# Patient Record
Sex: FEMALE | Race: WHITE | HISPANIC OR LATINO | Employment: FULL TIME | URBAN - METROPOLITAN AREA
[De-identification: names, ages, dates, MRNs, and addresses within clinical notes are randomized per-mention and may not be internally consistent; named-entity substitution may affect disease eponyms.]

---

## 2018-07-18 ENCOUNTER — APPOINTMENT (OUTPATIENT)
Dept: RADIOLOGY | Facility: MEDICAL CENTER | Age: 28
End: 2018-07-18
Attending: NURSE PRACTITIONER
Payer: COMMERCIAL

## 2018-07-26 ENCOUNTER — HOSPITAL ENCOUNTER (OUTPATIENT)
Dept: RADIOLOGY | Facility: MEDICAL CENTER | Age: 28
End: 2018-07-26
Attending: NURSE PRACTITIONER
Payer: COMMERCIAL

## 2018-07-26 DIAGNOSIS — R10.2 ADNEXAL TENDERNESS, RIGHT: ICD-10-CM

## 2018-07-26 DIAGNOSIS — R10.12 ABDOMINAL PAIN, LEFT UPPER QUADRANT: ICD-10-CM

## 2018-07-26 PROCEDURE — 76830 TRANSVAGINAL US NON-OB: CPT

## 2018-07-26 PROCEDURE — 76700 US EXAM ABDOM COMPLETE: CPT

## 2019-10-05 ENCOUNTER — HOSPITAL ENCOUNTER (OUTPATIENT)
Facility: MEDICAL CENTER | Age: 29
End: 2019-10-05
Attending: OBSTETRICS & GYNECOLOGY | Admitting: OBSTETRICS & GYNECOLOGY
Payer: COMMERCIAL

## 2019-12-13 ENCOUNTER — HOSPITAL ENCOUNTER (INPATIENT)
Facility: MEDICAL CENTER | Age: 29
LOS: 3 days | End: 2019-12-16
Attending: OBSTETRICS & GYNECOLOGY | Admitting: OBSTETRICS & GYNECOLOGY
Payer: COMMERCIAL

## 2019-12-13 ENCOUNTER — APPOINTMENT (OUTPATIENT)
Dept: RADIOLOGY | Facility: REHABILITATION | Age: 29
End: 2019-12-13
Attending: OBSTETRICS & GYNECOLOGY
Payer: COMMERCIAL

## 2019-12-13 ENCOUNTER — APPOINTMENT (OUTPATIENT)
Dept: RADIOLOGY | Facility: MEDICAL CENTER | Age: 29
End: 2019-12-13
Attending: OBSTETRICS & GYNECOLOGY
Payer: COMMERCIAL

## 2019-12-13 LAB
BASOPHILS # BLD AUTO: 0.6 % (ref 0–1.8)
BASOPHILS # BLD: 0.07 K/UL (ref 0–0.12)
EOSINOPHIL # BLD AUTO: 0.07 K/UL (ref 0–0.51)
EOSINOPHIL NFR BLD: 0.6 % (ref 0–6.9)
ERYTHROCYTE [DISTWIDTH] IN BLOOD BY AUTOMATED COUNT: 45.2 FL (ref 35.9–50)
HCT VFR BLD AUTO: 39.1 % (ref 37–47)
HGB BLD-MCNC: 13.2 G/DL (ref 12–16)
HOLDING TUBE BB 8507: NORMAL
IMM GRANULOCYTES # BLD AUTO: 0.48 K/UL (ref 0–0.11)
IMM GRANULOCYTES NFR BLD AUTO: 3.9 % (ref 0–0.9)
LYMPHOCYTES # BLD AUTO: 2.18 K/UL (ref 1–4.8)
LYMPHOCYTES NFR BLD: 17.5 % (ref 22–41)
MCH RBC QN AUTO: 29.1 PG (ref 27–33)
MCHC RBC AUTO-ENTMCNC: 33.8 G/DL (ref 33.6–35)
MCV RBC AUTO: 86.1 FL (ref 81.4–97.8)
MONOCYTES # BLD AUTO: 1.12 K/UL (ref 0–0.85)
MONOCYTES NFR BLD AUTO: 9 % (ref 0–13.4)
NEUTROPHILS # BLD AUTO: 8.51 K/UL (ref 2–7.15)
NEUTROPHILS NFR BLD: 68.4 % (ref 44–72)
NRBC # BLD AUTO: 0 K/UL
NRBC BLD-RTO: 0 /100 WBC
PLATELET # BLD AUTO: 205 K/UL (ref 164–446)
PMV BLD AUTO: 11 FL (ref 9–12.9)
RBC # BLD AUTO: 4.54 M/UL (ref 4.2–5.4)
WBC # BLD AUTO: 12.4 K/UL (ref 4.8–10.8)

## 2019-12-13 PROCEDURE — 3E0P7VZ INTRODUCTION OF HORMONE INTO FEMALE REPRODUCTIVE, VIA NATURAL OR ARTIFICIAL OPENING: ICD-10-PCS | Performed by: OBSTETRICS & GYNECOLOGY

## 2019-12-13 PROCEDURE — 770002 HCHG ROOM/CARE - OB PRIVATE (112)

## 2019-12-13 PROCEDURE — A9270 NON-COVERED ITEM OR SERVICE: HCPCS

## 2019-12-13 PROCEDURE — 36415 COLL VENOUS BLD VENIPUNCTURE: CPT

## 2019-12-13 PROCEDURE — 59200 INSERT CERVICAL DILATOR: CPT

## 2019-12-13 PROCEDURE — 700102 HCHG RX REV CODE 250 W/ 637 OVERRIDE(OP)

## 2019-12-13 PROCEDURE — 76819 FETAL BIOPHYS PROFIL W/O NST: CPT

## 2019-12-13 PROCEDURE — 85025 COMPLETE CBC W/AUTO DIFF WBC: CPT

## 2019-12-13 PROCEDURE — C1726 CATH, BAL DIL, NON-VASCULAR: HCPCS

## 2019-12-13 PROCEDURE — 700111 HCHG RX REV CODE 636 W/ 250 OVERRIDE (IP)

## 2019-12-13 PROCEDURE — 700105 HCHG RX REV CODE 258

## 2019-12-13 RX ORDER — ONDANSETRON 4 MG/1
4 TABLET, ORALLY DISINTEGRATING ORAL EVERY 6 HOURS PRN
Status: DISCONTINUED | OUTPATIENT
Start: 2019-12-13 | End: 2019-12-16 | Stop reason: HOSPADM

## 2019-12-13 RX ORDER — ALUMINA, MAGNESIA, AND SIMETHICONE 2400; 2400; 240 MG/30ML; MG/30ML; MG/30ML
30 SUSPENSION ORAL EVERY 6 HOURS PRN
Status: DISCONTINUED | OUTPATIENT
Start: 2019-12-13 | End: 2019-12-14 | Stop reason: HOSPADM

## 2019-12-13 RX ORDER — ONDANSETRON 2 MG/ML
4 INJECTION INTRAMUSCULAR; INTRAVENOUS EVERY 6 HOURS PRN
Status: DISCONTINUED | OUTPATIENT
Start: 2019-12-13 | End: 2019-12-16 | Stop reason: HOSPADM

## 2019-12-13 RX ORDER — SODIUM CHLORIDE, SODIUM LACTATE, POTASSIUM CHLORIDE, CALCIUM CHLORIDE 600; 310; 30; 20 MG/100ML; MG/100ML; MG/100ML; MG/100ML
INJECTION, SOLUTION INTRAVENOUS CONTINUOUS
Status: DISCONTINUED | OUTPATIENT
Start: 2019-12-13 | End: 2019-12-16 | Stop reason: HOSPADM

## 2019-12-13 RX ORDER — MISOPROSTOL 200 UG/1
800 TABLET ORAL
Status: DISCONTINUED | OUTPATIENT
Start: 2019-12-13 | End: 2019-12-14 | Stop reason: HOSPADM

## 2019-12-13 RX ORDER — METHYLERGONOVINE MALEATE 0.2 MG/ML
0.2 INJECTION INTRAVENOUS
Status: DISCONTINUED | OUTPATIENT
Start: 2019-12-13 | End: 2019-12-14 | Stop reason: HOSPADM

## 2019-12-13 RX ORDER — SODIUM CHLORIDE, SODIUM LACTATE, POTASSIUM CHLORIDE, CALCIUM CHLORIDE 600; 310; 30; 20 MG/100ML; MG/100ML; MG/100ML; MG/100ML
INJECTION, SOLUTION INTRAVENOUS
Status: COMPLETED
Start: 2019-12-13 | End: 2019-12-13

## 2019-12-13 RX ADMIN — OXYTOCIN 1 MILLI-UNITS/MIN: 10 INJECTION, SOLUTION INTRAMUSCULAR; INTRAVENOUS at 21:30

## 2019-12-13 RX ADMIN — SODIUM CHLORIDE, POTASSIUM CHLORIDE, SODIUM LACTATE AND CALCIUM CHLORIDE 1000 ML: 600; 310; 30; 20 INJECTION, SOLUTION INTRAVENOUS at 21:30

## 2019-12-13 RX ADMIN — MISOPROSTOL 25 MCG: 100 TABLET ORAL at 16:06

## 2019-12-13 SDOH — ECONOMIC STABILITY: TRANSPORTATION INSECURITY
IN THE PAST 12 MONTHS, HAS LACK OF TRANSPORTATION KEPT YOU FROM MEETINGS, WORK, OR FROM GETTING THINGS NEEDED FOR DAILY LIVING?: NO

## 2019-12-13 SDOH — ECONOMIC STABILITY: FOOD INSECURITY: WITHIN THE PAST 12 MONTHS, YOU WORRIED THAT YOUR FOOD WOULD RUN OUT BEFORE YOU GOT MONEY TO BUY MORE.: NEVER TRUE

## 2019-12-13 SDOH — ECONOMIC STABILITY: FOOD INSECURITY: WITHIN THE PAST 12 MONTHS, THE FOOD YOU BOUGHT JUST DIDN'T LAST AND YOU DIDN'T HAVE MONEY TO GET MORE.: NEVER TRUE

## 2019-12-13 SDOH — ECONOMIC STABILITY: TRANSPORTATION INSECURITY
IN THE PAST 12 MONTHS, HAS THE LACK OF TRANSPORTATION KEPT YOU FROM MEDICAL APPOINTMENTS OR FROM GETTING MEDICATIONS?: NO

## 2019-12-13 ASSESSMENT — PATIENT HEALTH QUESTIONNAIRE - PHQ9
1. LITTLE INTEREST OR PLEASURE IN DOING THINGS: NOT AT ALL
2. FEELING DOWN, DEPRESSED, IRRITABLE, OR HOPELESS: NOT AT ALL
SUM OF ALL RESPONSES TO PHQ9 QUESTIONS 1 AND 2: 0
1. LITTLE INTEREST OR PLEASURE IN DOING THINGS: NOT AT ALL
SUM OF ALL RESPONSES TO PHQ9 QUESTIONS 1 AND 2: 0
2. FEELING DOWN, DEPRESSED, IRRITABLE, OR HOPELESS: NOT AT ALL

## 2019-12-13 ASSESSMENT — LIFESTYLE VARIABLES
ALCOHOL_USE: NO
EVER_SMOKED: NEVER

## 2019-12-14 ENCOUNTER — ANESTHESIA EVENT (OUTPATIENT)
Dept: ANESTHESIOLOGY | Facility: MEDICAL CENTER | Age: 29
End: 2019-12-14
Payer: COMMERCIAL

## 2019-12-14 ENCOUNTER — ANESTHESIA (OUTPATIENT)
Dept: ANESTHESIOLOGY | Facility: MEDICAL CENTER | Age: 29
End: 2019-12-14
Payer: COMMERCIAL

## 2019-12-14 LAB
BASOPHILS # BLD AUTO: 0.1 % (ref 0–1.8)
BASOPHILS # BLD: 0.03 K/UL (ref 0–0.12)
EOSINOPHIL # BLD AUTO: 0 K/UL (ref 0–0.51)
EOSINOPHIL NFR BLD: 0 % (ref 0–6.9)
ERYTHROCYTE [DISTWIDTH] IN BLOOD BY AUTOMATED COUNT: 44.3 FL (ref 35.9–50)
HCT VFR BLD AUTO: 37.9 % (ref 37–47)
HGB BLD-MCNC: 12.9 G/DL (ref 12–16)
IMM GRANULOCYTES # BLD AUTO: 0.25 K/UL (ref 0–0.11)
IMM GRANULOCYTES NFR BLD AUTO: 1.2 % (ref 0–0.9)
LYMPHOCYTES # BLD AUTO: 1.24 K/UL (ref 1–4.8)
LYMPHOCYTES NFR BLD: 6.1 % (ref 22–41)
MCH RBC QN AUTO: 29.1 PG (ref 27–33)
MCHC RBC AUTO-ENTMCNC: 34 G/DL (ref 33.6–35)
MCV RBC AUTO: 85.4 FL (ref 81.4–97.8)
MONOCYTES # BLD AUTO: 1.47 K/UL (ref 0–0.85)
MONOCYTES NFR BLD AUTO: 7.3 % (ref 0–13.4)
NEUTROPHILS # BLD AUTO: 17.21 K/UL (ref 2–7.15)
NEUTROPHILS NFR BLD: 85.3 % (ref 44–72)
NRBC # BLD AUTO: 0 K/UL
NRBC BLD-RTO: 0 /100 WBC
PLATELET # BLD AUTO: 179 K/UL (ref 164–446)
PMV BLD AUTO: 10.8 FL (ref 9–12.9)
RBC # BLD AUTO: 4.44 M/UL (ref 4.2–5.4)
WBC # BLD AUTO: 20.2 K/UL (ref 4.8–10.8)

## 2019-12-14 PROCEDURE — 87086 URINE CULTURE/COLONY COUNT: CPT

## 2019-12-14 PROCEDURE — 303615 HCHG EPIDURAL/SPINAL ANESTHESIA FOR LABOR

## 2019-12-14 PROCEDURE — 304965 HCHG RECOVERY SERVICES

## 2019-12-14 PROCEDURE — A9270 NON-COVERED ITEM OR SERVICE: HCPCS | Performed by: OBSTETRICS & GYNECOLOGY

## 2019-12-14 PROCEDURE — 10907ZC DRAINAGE OF AMNIOTIC FLUID, THERAPEUTIC FROM PRODUCTS OF CONCEPTION, VIA NATURAL OR ARTIFICIAL OPENING: ICD-10-PCS | Performed by: OBSTETRICS & GYNECOLOGY

## 2019-12-14 PROCEDURE — 700102 HCHG RX REV CODE 250 W/ 637 OVERRIDE(OP): Performed by: OBSTETRICS & GYNECOLOGY

## 2019-12-14 PROCEDURE — 700101 HCHG RX REV CODE 250

## 2019-12-14 PROCEDURE — 700111 HCHG RX REV CODE 636 W/ 250 OVERRIDE (IP)

## 2019-12-14 PROCEDURE — 59409 OBSTETRICAL CARE: CPT

## 2019-12-14 PROCEDURE — 770002 HCHG ROOM/CARE - OB PRIVATE (112)

## 2019-12-14 PROCEDURE — 36415 COLL VENOUS BLD VENIPUNCTURE: CPT

## 2019-12-14 PROCEDURE — 700105 HCHG RX REV CODE 258: Performed by: OBSTETRICS & GYNECOLOGY

## 2019-12-14 PROCEDURE — 85025 COMPLETE CBC W/AUTO DIFF WBC: CPT

## 2019-12-14 PROCEDURE — 700111 HCHG RX REV CODE 636 W/ 250 OVERRIDE (IP): Performed by: OBSTETRICS & GYNECOLOGY

## 2019-12-14 PROCEDURE — 87040 BLOOD CULTURE FOR BACTERIA: CPT

## 2019-12-14 RX ORDER — ROPIVACAINE HYDROCHLORIDE 2 MG/ML
INJECTION, SOLUTION EPIDURAL; INFILTRATION; PERINEURAL CONTINUOUS
Status: DISCONTINUED | OUTPATIENT
Start: 2019-12-14 | End: 2019-12-16 | Stop reason: HOSPADM

## 2019-12-14 RX ORDER — CEFAZOLIN SODIUM 2 G/100ML
2 INJECTION, SOLUTION INTRAVENOUS EVERY 8 HOURS
Status: DISCONTINUED | OUTPATIENT
Start: 2019-12-14 | End: 2019-12-16

## 2019-12-14 RX ORDER — DOCUSATE SODIUM 100 MG/1
100 CAPSULE, LIQUID FILLED ORAL 2 TIMES DAILY PRN
Status: DISCONTINUED | OUTPATIENT
Start: 2019-12-14 | End: 2019-12-16 | Stop reason: HOSPADM

## 2019-12-14 RX ORDER — SODIUM CHLORIDE, SODIUM LACTATE, POTASSIUM CHLORIDE, AND CALCIUM CHLORIDE .6; .31; .03; .02 G/100ML; G/100ML; G/100ML; G/100ML
1000 INJECTION, SOLUTION INTRAVENOUS
Status: DISCONTINUED | OUTPATIENT
Start: 2019-12-14 | End: 2019-12-14 | Stop reason: HOSPADM

## 2019-12-14 RX ORDER — VITAMIN A ACETATE, BETA CAROTENE, ASCORBIC ACID, CHOLECALCIFEROL, .ALPHA.-TOCOPHEROL ACETATE, DL-, THIAMINE MONONITRATE, RIBOFLAVIN, NIACINAMIDE, PYRIDOXINE HYDROCHLORIDE, FOLIC ACID, CYANOCOBALAMIN, CALCIUM CARBONATE, FERROUS FUMARATE, ZINC OXIDE, CUPRIC OXIDE 3080; 12; 120; 400; 1; 1.84; 3; 20; 22; 920; 25; 200; 27; 10; 2 [IU]/1; UG/1; MG/1; [IU]/1; MG/1; MG/1; MG/1; MG/1; MG/1; [IU]/1; MG/1; MG/1; MG/1; MG/1; MG/1
1 TABLET, FILM COATED ORAL EVERY MORNING
Status: DISCONTINUED | OUTPATIENT
Start: 2019-12-15 | End: 2019-12-16 | Stop reason: HOSPADM

## 2019-12-14 RX ORDER — BISACODYL 10 MG
10 SUPPOSITORY, RECTAL RECTAL PRN
Status: DISCONTINUED | OUTPATIENT
Start: 2019-12-14 | End: 2019-12-16 | Stop reason: HOSPADM

## 2019-12-14 RX ORDER — OXYCODONE HYDROCHLORIDE AND ACETAMINOPHEN 5; 325 MG/1; MG/1
2 TABLET ORAL EVERY 4 HOURS PRN
Status: DISCONTINUED | OUTPATIENT
Start: 2019-12-14 | End: 2019-12-16 | Stop reason: HOSPADM

## 2019-12-14 RX ORDER — DEXTROSE, SODIUM CHLORIDE, SODIUM LACTATE, POTASSIUM CHLORIDE, AND CALCIUM CHLORIDE 5; .6; .31; .03; .02 G/100ML; G/100ML; G/100ML; G/100ML; G/100ML
INJECTION, SOLUTION INTRAVENOUS CONTINUOUS
Status: DISCONTINUED | OUTPATIENT
Start: 2019-12-14 | End: 2019-12-16 | Stop reason: HOSPADM

## 2019-12-14 RX ORDER — MISOPROSTOL 200 UG/1
600 TABLET ORAL
Status: DISCONTINUED | OUTPATIENT
Start: 2019-12-14 | End: 2019-12-16 | Stop reason: HOSPADM

## 2019-12-14 RX ORDER — ROPIVACAINE HYDROCHLORIDE 2 MG/ML
INJECTION, SOLUTION EPIDURAL; INFILTRATION; PERINEURAL
Status: COMPLETED
Start: 2019-12-14 | End: 2019-12-14

## 2019-12-14 RX ORDER — ACETAMINOPHEN 325 MG/1
650 TABLET ORAL ONCE
Status: COMPLETED | OUTPATIENT
Start: 2019-12-14 | End: 2019-12-14

## 2019-12-14 RX ORDER — SODIUM CHLORIDE, SODIUM LACTATE, POTASSIUM CHLORIDE, AND CALCIUM CHLORIDE .6; .31; .03; .02 G/100ML; G/100ML; G/100ML; G/100ML
250 INJECTION, SOLUTION INTRAVENOUS PRN
Status: DISCONTINUED | OUTPATIENT
Start: 2019-12-14 | End: 2019-12-14 | Stop reason: HOSPADM

## 2019-12-14 RX ORDER — METHYLERGONOVINE MALEATE 0.2 MG/ML
0.2 INJECTION INTRAVENOUS
Status: DISCONTINUED | OUTPATIENT
Start: 2019-12-14 | End: 2019-12-16 | Stop reason: HOSPADM

## 2019-12-14 RX ORDER — OXYCODONE HYDROCHLORIDE AND ACETAMINOPHEN 5; 325 MG/1; MG/1
1 TABLET ORAL EVERY 4 HOURS PRN
Status: DISCONTINUED | OUTPATIENT
Start: 2019-12-14 | End: 2019-12-16 | Stop reason: HOSPADM

## 2019-12-14 RX ORDER — CARBOPROST TROMETHAMINE 250 UG/ML
250 INJECTION, SOLUTION INTRAMUSCULAR
Status: DISCONTINUED | OUTPATIENT
Start: 2019-12-14 | End: 2019-12-16 | Stop reason: HOSPADM

## 2019-12-14 RX ORDER — IBUPROFEN 600 MG/1
600 TABLET ORAL EVERY 6 HOURS PRN
Status: DISCONTINUED | OUTPATIENT
Start: 2019-12-14 | End: 2019-12-16 | Stop reason: HOSPADM

## 2019-12-14 RX ORDER — SODIUM CHLORIDE, SODIUM LACTATE, POTASSIUM CHLORIDE, CALCIUM CHLORIDE 600; 310; 30; 20 MG/100ML; MG/100ML; MG/100ML; MG/100ML
INJECTION, SOLUTION INTRAVENOUS PRN
Status: DISCONTINUED | OUTPATIENT
Start: 2019-12-14 | End: 2019-12-16 | Stop reason: HOSPADM

## 2019-12-14 RX ADMIN — ROPIVACAINE HYDROCHLORIDE 200 MG: 2 INJECTION, SOLUTION EPIDURAL; INFILTRATION; PERINEURAL at 11:35

## 2019-12-14 RX ADMIN — SODIUM CHLORIDE, POTASSIUM CHLORIDE, SODIUM LACTATE AND CALCIUM CHLORIDE: 600; 310; 30; 20 INJECTION, SOLUTION INTRAVENOUS at 11:18

## 2019-12-14 RX ADMIN — SODIUM CHLORIDE, SODIUM LACTATE, POTASSIUM CHLORIDE, CALCIUM CHLORIDE AND DEXTROSE MONOHYDRATE: 5; 600; 310; 30; 20 INJECTION, SOLUTION INTRAVENOUS at 12:27

## 2019-12-14 RX ADMIN — FENTANYL CITRATE 100 MCG: 0.05 INJECTION, SOLUTION INTRAMUSCULAR; INTRAVENOUS at 09:49

## 2019-12-14 RX ADMIN — ROPIVACAINE HYDROCHLORIDE 200 MG: 2 INJECTION, SOLUTION EPIDURAL; INFILTRATION at 11:35

## 2019-12-14 RX ADMIN — SODIUM CHLORIDE, POTASSIUM CHLORIDE, SODIUM LACTATE AND CALCIUM CHLORIDE: 600; 310; 30; 20 INJECTION, SOLUTION INTRAVENOUS at 05:39

## 2019-12-14 RX ADMIN — OXYTOCIN 20 UNITS: 10 INJECTION, SOLUTION INTRAMUSCULAR; INTRAVENOUS at 21:29

## 2019-12-14 RX ADMIN — SODIUM CHLORIDE, POTASSIUM CHLORIDE, SODIUM LACTATE AND CALCIUM CHLORIDE: 600; 310; 30; 20 INJECTION, SOLUTION INTRAVENOUS at 10:33

## 2019-12-14 RX ADMIN — EPHEDRINE SULFATE 5 MG: 50 INJECTION INTRAVENOUS at 12:25

## 2019-12-14 RX ADMIN — FENTANYL CITRATE 100 MCG: 0.05 INJECTION, SOLUTION INTRAMUSCULAR; INTRAVENOUS at 04:29

## 2019-12-14 RX ADMIN — ACETAMINOPHEN 650 MG: 325 TABLET, FILM COATED ORAL at 21:27

## 2019-12-14 ASSESSMENT — PATIENT HEALTH QUESTIONNAIRE - PHQ9
2. FEELING DOWN, DEPRESSED, IRRITABLE, OR HOPELESS: NOT AT ALL
1. LITTLE INTEREST OR PLEASURE IN DOING THINGS: NOT AT ALL
SUM OF ALL RESPONSES TO PHQ9 QUESTIONS 1 AND 2: 0

## 2019-12-14 NOTE — PROGRESS NOTES
Labor Progress Note    Pricila Velasquez   38w4d      Subjective:  Pt now comfortable with epidural.  Uterine contractions:yes  Pain: No    Objective:   Vitals:    12/14/19 0540 12/14/19 0658 12/14/19 0749 12/14/19 0954   BP: 123/65 132/68 132/75 145/66   Pulse: 78 78 75 75   Temp:  36.7 °C (98 °F) 36.7 °C (98 °F) 37 °C (98.6 °F)   TempSrc:  Temporal Temporal Temporal   Weight:       Height:         Fetal heart variability:130's decreased variability, positive scalp stim  [place]: q 1-2  SVE: 2/90/0        Membranes ruptured: .yes    Meds:   Epidural : .yes  Pitocin: .yes, 5 mu    Labs:  Recent Results (from the past 24 hour(s))   CBC WITH DIFFERENTIAL    Collection Time: 12/13/19  2:50 PM   Result Value Ref Range    WBC 12.4 (H) 4.8 - 10.8 K/uL    RBC 4.54 4.20 - 5.40 M/uL    Hemoglobin 13.2 12.0 - 16.0 g/dL    Hematocrit 39.1 37.0 - 47.0 %    MCV 86.1 81.4 - 97.8 fL    MCH 29.1 27.0 - 33.0 pg    MCHC 33.8 33.6 - 35.0 g/dL    RDW 45.2 35.9 - 50.0 fL    Platelet Count 205 164 - 446 K/uL    MPV 11.0 9.0 - 12.9 fL    Neutrophils-Polys 68.40 44.00 - 72.00 %    Lymphocytes 17.50 (L) 22.00 - 41.00 %    Monocytes 9.00 0.00 - 13.40 %    Eosinophils 0.60 0.00 - 6.90 %    Basophils 0.60 0.00 - 1.80 %    Immature Granulocytes 3.90 (H) 0.00 - 0.90 %    Nucleated RBC 0.00 /100 WBC    Neutrophils (Absolute) 8.51 (H) 2.00 - 7.15 K/uL    Lymphs (Absolute) 2.18 1.00 - 4.80 K/uL    Monos (Absolute) 1.12 (H) 0.00 - 0.85 K/uL    Eos (Absolute) 0.07 0.00 - 0.51 K/uL    Baso (Absolute) 0.07 0.00 - 0.12 K/uL    Immature Granulocytes (abs) 0.48 (H) 0.00 - 0.11 K/uL    NRBC (Absolute) 0.00 K/uL   Hold Blood Bank Specimen (Not Tested)    Collection Time: 12/13/19  2:50 PM   Result Value Ref Range    Holding Tube - Bb DONE        Assessment:   38w4d/oligohydramnios/IOL- pt comfortable with epidural. CPM.  GBBS negative          Joanie Morales

## 2019-12-14 NOTE — PROGRESS NOTES
Labor Progress Note    Pricila Velasquez   38w4d      Subjective:  Pt hurting with contractions. Pt wants epidural.  Uterine contractions:yes  Pain: Yes. Severity: moderate    Objective:   Vitals:    19 0540 19 0658 19 0749 19 0954   BP: 123/65 132/68 132/75 145/66   Pulse: 78 78 75 75   Temp:  36.7 °C (98 °F) 36.7 °C (98 °F) 37 °C (98.6 °F)   TempSrc:  Temporal Temporal Temporal   Weight:       Height:         Fetal heart variability: 140's category 1  [place]: q 1-2  SVE: 2/-1, per nurse    Membranes ruptured: .yes    Meds:   Epidural : .no  Pitocin: .yes, 5 mu    Labs:  Recent Results (from the past 24 hour(s))   CBC WITH DIFFERENTIAL    Collection Time: 19  2:50 PM   Result Value Ref Range    WBC 12.4 (H) 4.8 - 10.8 K/uL    RBC 4.54 4.20 - 5.40 M/uL    Hemoglobin 13.2 12.0 - 16.0 g/dL    Hematocrit 39.1 37.0 - 47.0 %    MCV 86.1 81.4 - 97.8 fL    MCH 29.1 27.0 - 33.0 pg    MCHC 33.8 33.6 - 35.0 g/dL    RDW 45.2 35.9 - 50.0 fL    Platelet Count 205 164 - 446 K/uL    MPV 11.0 9.0 - 12.9 fL    Neutrophils-Polys 68.40 44.00 - 72.00 %    Lymphocytes 17.50 (L) 22.00 - 41.00 %    Monocytes 9.00 0.00 - 13.40 %    Eosinophils 0.60 0.00 - 6.90 %    Basophils 0.60 0.00 - 1.80 %    Immature Granulocytes 3.90 (H) 0.00 - 0.90 %    Nucleated RBC 0.00 /100 WBC    Neutrophils (Absolute) 8.51 (H) 2.00 - 7.15 K/uL    Lymphs (Absolute) 2.18 1.00 - 4.80 K/uL    Monos (Absolute) 1.12 (H) 0.00 - 0.85 K/uL    Eos (Absolute) 0.07 0.00 - 0.51 K/uL    Baso (Absolute) 0.07 0.00 - 0.12 K/uL    Immature Granulocytes (abs) 0.48 (H) 0.00 - 0.11 K/uL    NRBC (Absolute) 0.00 K/uL   Hold Blood Bank Specimen (Not Tested)    Collection Time: 19  2:50 PM   Result Value Ref Range    Holding Tube - Bb DONE        Assessment:   38w4d/oligohydramnios- CPM, exp   GBBS-negative  Pain control- may have epidural.         Joanie Morales

## 2019-12-14 NOTE — PROGRESS NOTES
"Labor Progress Note    Pricila Tiara Rendon   38w3d      Subjective:  Pt feeling contractions, about 5/10 in severity.  Uterine contractions:yes  Pain: Yes. Severity: mild    Objective:   Vitals:    12/13/19 1555 12/13/19 1743 12/13/19 1911   BP:  113/65 123/65   Pulse:  90 (!) 108   Weight: 76.2 kg (168 lb)     Height: 1.499 m (4' 11\")       Fetal heart variability: 130's category 1  [place]: q 2-3  SVE: 1/25/-3, vertex      Membranes ruptured: .no    Meds:   Epidural : .no  Pitocin: .no  Cytotec 25mcg at 16:00    Labs:  Recent Results (from the past 24 hour(s))   CBC WITH DIFFERENTIAL    Collection Time: 12/13/19  2:50 PM   Result Value Ref Range    WBC 12.4 (H) 4.8 - 10.8 K/uL    RBC 4.54 4.20 - 5.40 M/uL    Hemoglobin 13.2 12.0 - 16.0 g/dL    Hematocrit 39.1 37.0 - 47.0 %    MCV 86.1 81.4 - 97.8 fL    MCH 29.1 27.0 - 33.0 pg    MCHC 33.8 33.6 - 35.0 g/dL    RDW 45.2 35.9 - 50.0 fL    Platelet Count 205 164 - 446 K/uL    MPV 11.0 9.0 - 12.9 fL    Neutrophils-Polys 68.40 44.00 - 72.00 %    Lymphocytes 17.50 (L) 22.00 - 41.00 %    Monocytes 9.00 0.00 - 13.40 %    Eosinophils 0.60 0.00 - 6.90 %    Basophils 0.60 0.00 - 1.80 %    Immature Granulocytes 3.90 (H) 0.00 - 0.90 %    Nucleated RBC 0.00 /100 WBC    Neutrophils (Absolute) 8.51 (H) 2.00 - 7.15 K/uL    Lymphs (Absolute) 2.18 1.00 - 4.80 K/uL    Monos (Absolute) 1.12 (H) 0.00 - 0.85 K/uL    Eos (Absolute) 0.07 0.00 - 0.51 K/uL    Baso (Absolute) 0.07 0.00 - 0.12 K/uL    Immature Granulocytes (abs) 0.48 (H) 0.00 - 0.11 K/uL    NRBC (Absolute) 0.00 K/uL   Hold Blood Bank Specimen (Not Tested)    Collection Time: 12/13/19  2:50 PM   Result Value Ref Range    Holding Tube - Bb DONE        Assessment:   38w3d/oligohydramios- pt s/p cytotec 25mcg per vagina. Will place Cooks ripening balloon and consider pitocin if contractions space out.  GBBS-negative  Fetal status-reassuring  Joanie Morales          "

## 2019-12-14 NOTE — PROGRESS NOTES
Labor Progress Note    Pricila Velasquez   38w4d      Subjective:  Pt hurting with contractions. Rates pain 8/10 and wants pain meds. Cooks balloon in place  Uterine contractions:yes  Pain: Yes. Severity: moderate    Objective:   Vitals:    12/13/19 1743 12/13/19 1911 12/14/19 0030 12/14/19 0300   BP: 113/65 123/65 123/71 117/61   Pulse: 90 (!) 108 90 90   Temp:  36.4 °C (97.6 °F) 37 °C (98.6 °F) 36.7 °C (98 °F)   TempSrc:  Temporal Temporal Temporal   Weight:       Height:         Fetal heart variability: 140's category 1  [place]: q 1-2        Membranes ruptured: .no    Meds:   Epidural : .no  Pitocin: .yes, 5 mu    Labs:  Recent Results (from the past 24 hour(s))   CBC WITH DIFFERENTIAL    Collection Time: 12/13/19  2:50 PM   Result Value Ref Range    WBC 12.4 (H) 4.8 - 10.8 K/uL    RBC 4.54 4.20 - 5.40 M/uL    Hemoglobin 13.2 12.0 - 16.0 g/dL    Hematocrit 39.1 37.0 - 47.0 %    MCV 86.1 81.4 - 97.8 fL    MCH 29.1 27.0 - 33.0 pg    MCHC 33.8 33.6 - 35.0 g/dL    RDW 45.2 35.9 - 50.0 fL    Platelet Count 205 164 - 446 K/uL    MPV 11.0 9.0 - 12.9 fL    Neutrophils-Polys 68.40 44.00 - 72.00 %    Lymphocytes 17.50 (L) 22.00 - 41.00 %    Monocytes 9.00 0.00 - 13.40 %    Eosinophils 0.60 0.00 - 6.90 %    Basophils 0.60 0.00 - 1.80 %    Immature Granulocytes 3.90 (H) 0.00 - 0.90 %    Nucleated RBC 0.00 /100 WBC    Neutrophils (Absolute) 8.51 (H) 2.00 - 7.15 K/uL    Lymphs (Absolute) 2.18 1.00 - 4.80 K/uL    Monos (Absolute) 1.12 (H) 0.00 - 0.85 K/uL    Eos (Absolute) 0.07 0.00 - 0.51 K/uL    Baso (Absolute) 0.07 0.00 - 0.12 K/uL    Immature Granulocytes (abs) 0.48 (H) 0.00 - 0.11 K/uL    NRBC (Absolute) 0.00 K/uL   Hold Blood Bank Specimen (Not Tested)    Collection Time: 12/13/19  2:50 PM   Result Value Ref Range    Holding Tube - Bb DONE        Assessment:   38w4d/oligohydramnios- s/p cytotec 25 mcg at 4:00 pm, now Cooks Balloon in place and pitocin at 5 mu. CPM, will remove balloon at 8:00 am.    GBBS-negative  Fetal status-reassuring        Joanie Morales

## 2019-12-14 NOTE — H&P
CHIEF COMPLAINT:  Oligohydramnios with an BALJIT of 4.3.    HISTORY OF PRESENT ILLNESS:  This patient is a 29-year-old  2, para 0   with a last menstrual cycle of 2019 with a due date of 2019 based   on a 7-week ultrasound.  Patient has had prenatal care with me.  Her 1-hour   glucose was elevated, but a 3-hour was normal.  Otherwise, her prenatal care   has been uncomplicated.  On 2019, she had an ultrasound for size greater   than dates.  The ultrasound showed small for gestational age fetus measuring   at 36 weeks and 5 days with an estimated fetal weight of 6 pounds 11 ounces.    The fluid was noted to be low at 4.5.  Patient was still working full time and   therefore the patient was taken out of work and the patient was instructed to   do kick counts and come back in 48 hours for repeat fluid check.  She came in   today.  She has been having good fetal movement.  The fluid in the office was   about 4.8.  The patient was therefore sent to Labor And Delivery.  In Labor   and Delivery, the fluid was 4.3.  She has got a reassuring strip.  She is   closed, thick and high, but the decision has been made to proceed with   induction of labor secondary to oligohydramnios at term.  Patient denies any   history of leaking of fluid.  She has been having good fetal movement and rare   contractions.    PAST MEDICAL HISTORY:  Previous ectopic pregnancy.    PAST SURGICAL HISTORY:  Laparotomy for removal of ectopic pregnancy in 2016.    MEDICATIONS:  1.  She is on prenatal vitamins.  2.  Ferrous sulfate 325 mg 1 p.o. b.i.d.    ALLERGIES:  No known drug allergies.    OBSTETRICAL HISTORY:  She is a  2, para 0.  In 2016, patient had an   ectopic on the left and therefore had a laparotomy and left salpingectomy.    GYNECOLOGIC HISTORY:  Patient started menstruating at age 13, has menstrual   cycles every 28 days, last about 5 days.  No history of STDs.  No history of   HSV.  Patient's last Pap smear  was on 2019.  It was negative Pap,   negative chlamydia, negative gonorrhea, negative trichomonas.    SOCIAL HISTORY:  The patient is .  She denies tobacco, alcohol or drug   use.    PHYSICAL EXAMINATION:  VITAL SIGNS:  Stable.  She is afebrile.  GENERAL:  Pleasant female in no acute distress.  LUNGS:  Clear to auscultation bilaterally.  CARDIOVASCULAR:  Regular rate and rhythm.  No murmur.  ABDOMEN:  Soft, gravid.  [place]'s to 7 pounds.  EXTREMITIES:  No calf tenderness.  PELVIC:  Fetal heart tones are 130s, category 1.  [place] ahmet   irregularly.  Sterile vaginal exam, she is closed, thick and high.  By   ultrasound, the BALJIT is 4.3.  Fetus is vertex.    LABORATORY DATA:  Patient's prenatal labs, she is GBS negative.  One-hour   glucose 146, 3-hour glucose tolerance test, fasting 77, 1-hour 134, 2 hours   110, 3-hour 96.  Normal 3-hour GTT.  H and H at 28 weeks 11.2 and 34.6 and   platelets were 255.  Patient's quadruple screen negative.  Patient's cystic   fibrosis negative.  Patient's hepatitis B surface antigen negative, RPR   nonreactive, rubella immune.  She is O positive, antibody screen negative.    Again, ultrasound performed.  Estimated fetal weight 6 pounds 11 ounces,   vertex, anterior placenta, no previa.  BALJIT of 4.5.    ASSESSMENT AND PLAN:  1.  A 29-year-old  2, para 0 with a due date of 2019 at 38 weeks   and 3 days.  2.  Oligohydramnios.  Patient is term, not a tocolytic or steroid candidate.    There is no evidence of spontaneous rupture of membranes, we are proceeding   now with induction of labor.  We will start with Cytotec 25 mcg per vagina and   continue q. 4 hours.  We will expect a normal spontaneous vaginal delivery.    Patient is aware of the reasons for induction of labor and she has no   unanswered questions.  3.  Group B streptococcus negative.  4.  One-hour glucose tolerance test elevated followed by a 3-hour that was   normal.  5.  Fetal status  jakub.       ____________________________________     MD MICHELLE VERAS / ИВАН    DD:  12/13/2019 16:44:47  DT:  12/13/2019 18:39:02    D#:  [id number]  Job#:  [id number]

## 2019-12-14 NOTE — PROGRESS NOTES
Labor Progress Note    Pricila Tiara Velasquez   38w4d      Subjective:  Pt hurting with contractions. Pt had relief with one dose of Fentanyl.   Uterine contractions:yes  Pain: Yes. Severity: mild    Objective:   Vitals:    12/14/19 0300 12/14/19 0410 12/14/19 0540 12/14/19 0658   BP: 117/61 121/79 123/65 132/68   Pulse: 90 85 78 78   Temp: 36.7 °C (98 °F) 36.4 °C (97.6 °F)  36.7 °C (98 °F)   TempSrc: Temporal Temporal  Temporal   Weight:       Height:         Fetal heart variability: 130's category 1  [place]: q 1-2  SVE: Cooks ballon removed, SVE: 2/75/-2, arom, clear, IUPC placed.   Membranes ruptured: .yes, clear    Meds:   Epidural : .no  Pitocin: .yes, 7 mu    Labs:  Recent Results (from the past 24 hour(s))   CBC WITH DIFFERENTIAL    Collection Time: 12/13/19  2:50 PM   Result Value Ref Range    WBC 12.4 (H) 4.8 - 10.8 K/uL    RBC 4.54 4.20 - 5.40 M/uL    Hemoglobin 13.2 12.0 - 16.0 g/dL    Hematocrit 39.1 37.0 - 47.0 %    MCV 86.1 81.4 - 97.8 fL    MCH 29.1 27.0 - 33.0 pg    MCHC 33.8 33.6 - 35.0 g/dL    RDW 45.2 35.9 - 50.0 fL    Platelet Count 205 164 - 446 K/uL    MPV 11.0 9.0 - 12.9 fL    Neutrophils-Polys 68.40 44.00 - 72.00 %    Lymphocytes 17.50 (L) 22.00 - 41.00 %    Monocytes 9.00 0.00 - 13.40 %    Eosinophils 0.60 0.00 - 6.90 %    Basophils 0.60 0.00 - 1.80 %    Immature Granulocytes 3.90 (H) 0.00 - 0.90 %    Nucleated RBC 0.00 /100 WBC    Neutrophils (Absolute) 8.51 (H) 2.00 - 7.15 K/uL    Lymphs (Absolute) 2.18 1.00 - 4.80 K/uL    Monos (Absolute) 1.12 (H) 0.00 - 0.85 K/uL    Eos (Absolute) 0.07 0.00 - 0.51 K/uL    Baso (Absolute) 0.07 0.00 - 0.12 K/uL    Immature Granulocytes (abs) 0.48 (H) 0.00 - 0.11 K/uL    NRBC (Absolute) 0.00 K/uL   Hold Blood Bank Specimen (Not Tested)    Collection Time: 12/13/19  2:50 PM   Result Value Ref Range    Holding Tube - Bb DONE        Assessment:   38w4d/oligohydramnios- pt s/p cytotec 25 mcg x 1, s/p Cooks ripening balloon, now removed. Pt on cytotec, continue  with IOL.  GBBS- negative  Fetal status- reassuring.    Joanie Morales

## 2019-12-14 NOTE — ANESTHESIA PROCEDURE NOTES
Epidural Block  Date/Time: 12/14/2019 11:19 AM  Performed by: Melvin Villarreal M.D.  Authorized by: Melvin Villarreal M.D.     Patient Location:  OB  Start Time:  12/14/2019 11:19 AM  End Time:  12/14/2019 11:25 AM  Reason for Block: labor analgesia    patient identified, IV checked, site marked, risks and benefits discussed, surgical consent, monitors and equipment checked, pre-op evaluation and timeout performed    Patient Position:  Sitting  Prep: ChloraPrep, patient draped and sterile technique    Monitoring:  Blood pressure, continuous pulse oximetry and heart rate  Approach:  Midline  Location:  L2-L3  Injection Technique:  GARIMA saline  Skin infiltration:  Lidocaine  Strength:  1%  Dose:  3ml  Needle Type:  Tuohy  Needle Gauge:  17 G  Needle Length:  3.5 in  Loss of resistance::  6  Catheter Size:  19 G  Catheter at Skin Depth:  10  Test Dose:  Lidocaine 1.5% with epinephrine 1-to-200,000

## 2019-12-14 NOTE — PROGRESS NOTES
0700- Report received from KAPIL Mcnulty. Pt helped to BR, family in room. Pt coping well with contractions at this time. Cervical balloon in place at this time. LR at 125 ml/hr and Pitocin increased to 7 ketty-units/min. POC discussed and assumed. EFM and TOCO in place and reading well.   0738- Dr Morales at bedside to assess pt. Balloon removed and SVE 2/75/-2. AROM moderate amount of clear fluid. IUPC placed. Orders to increase the pitocin by 2 ketty-units/min every 30 mins as indicated. Pt tolerated well and repositioned for comfort.   0935- Pt c/o pressure. SVE 2/90/-1. Pt is uncomfortably and would like fentanyl. There are some repetitive variables. Dr Morales will review the strip and call RN.  1020- pt uncomfortable and would like an epidural. LR bolus started.   1025- Dr Morales at bedside and updated on pt status  1217- Pt BP 90/50. Dr Villarreal made aware. Pt denies feeling dizzy or nauseous but baby is having late decels. Orders to give Ephedrine.  1228- /63  1230- /68 decels have resolved.   1412- O2 placed due to minimal variability and late decels. Pt on far right side with double peanut ball.   1545- O2 removed.   1635- Dr Morales on unit and updated on pt status. Orders to have RN re-check in 1 hr and call her with result.   1708- SVE 9/100/+2. Dr Morales called and made aware. Room prepped for delivery.   1720- O2 placed for minimal variability and late decels.   1806- SVE Lip/+2.   1854- Report given to KAPIL Mcnulty. SVE Lip/+2. Fetal . Dr Morales called and made aware. Pt oral temp of 99.9. Dr Morales will come to bedside to see if she can reduce the lip.

## 2019-12-14 NOTE — PROGRESS NOTES
1900 Report received from SILVA Ordaz RN. Pt comfortable with contractions at this time.     2004 Dr Morales at bedside to evaluate pt. SVE as noted. Strip reviewed by MD. Pt ahmet too much for cytotec. Pt educated on POC to insert cook's balloon with low dose pitocin by MD. Pt verbalized understanding and agreed to plan.     2037 Dr Morales at bedside. Cooks balloon placed by MD. 60cc of sterile water in uterine and 60 cc in vaginal. Pt tolerated procedure well.     2045 Pt ahmet too much to start Pitocin. Will continue to monitor.  Pt encouraged to void and educated on pain interventions options.     0410 Strip reviewed with Dr Morales. Okay per MD to continue increasing Pitocin per MAR orders.       7032 Report given to SILVA Ordaz RN

## 2019-12-14 NOTE — PROGRESS NOTES
Late entry.     1430- Report received from KAPIL Garzon. SOCO Jordan started PIV in left hand. Pt tolerated well and labs sent. EFM and TOCO on and reading well. Orders for contraction stress per Dr Morales however contraction noted on strip. Will wait for another 10 mins and see if pt has contractions on her own and how baby tolerates. VSS. Pt is IOL due to oligo.   1510- Dr Morales called and made aware of two contractions within 8 mins without use of pitocin. No decels noted do negative contraction stress test. Orders to place 25 mcg of cytotec vaginally and to allow pt to eat. Pt made aware of plan.   1610- SVE closed/ thick/high. Cytotec placed and pt educated on importance of remaining supine for 1 hr post. Dr Morales in room to talk with pt. Orders to re-check her in 4 hrs.   1900- Report given to KAPIL Mcnulty.

## 2019-12-14 NOTE — PROGRESS NOTES
"Labor Progress Note    Pricila Velasquez   IUP at 38.3 weeks with oligohydramnios      Subjective:  Pt with BALJIT: 4.3. Pt here for IOL. Pt with good fetal mvt.  Uterine contractions:no  Pain: No    Objective:   Vitals:    12/13/19 1555   Weight: 76.2 kg (168 lb)   Height: 1.499 m (4' 11\")     Fetal heart variability:140's category 1  [place]: occasional  SVE: cl/th/high, cytotec 25 mcg placed just now    Meds:   Epidural : .no  Pitocin: .no  Cytotec 25 mcg per vagina    Labs:  Recent Results (from the past 24 hour(s))   CBC WITH DIFFERENTIAL    Collection Time: 12/13/19  2:50 PM   Result Value Ref Range    WBC 12.4 (H) 4.8 - 10.8 K/uL    RBC 4.54 4.20 - 5.40 M/uL    Hemoglobin 13.2 12.0 - 16.0 g/dL    Hematocrit 39.1 37.0 - 47.0 %    MCV 86.1 81.4 - 97.8 fL    MCH 29.1 27.0 - 33.0 pg    MCHC 33.8 33.6 - 35.0 g/dL    RDW 45.2 35.9 - 50.0 fL    Platelet Count 205 164 - 446 K/uL    MPV 11.0 9.0 - 12.9 fL    Neutrophils-Polys 68.40 44.00 - 72.00 %    Lymphocytes 17.50 (L) 22.00 - 41.00 %    Monocytes 9.00 0.00 - 13.40 %    Eosinophils 0.60 0.00 - 6.90 %    Basophils 0.60 0.00 - 1.80 %    Immature Granulocytes 3.90 (H) 0.00 - 0.90 %    Nucleated RBC 0.00 /100 WBC    Neutrophils (Absolute) 8.51 (H) 2.00 - 7.15 K/uL    Lymphs (Absolute) 2.18 1.00 - 4.80 K/uL    Monos (Absolute) 1.12 (H) 0.00 - 0.85 K/uL    Eos (Absolute) 0.07 0.00 - 0.51 K/uL    Baso (Absolute) 0.07 0.00 - 0.12 K/uL    Immature Granulocytes (abs) 0.48 (H) 0.00 - 0.11 K/uL    NRBC (Absolute) 0.00 K/uL       Assessment:   IUP at 38.3/oligohydraminos- pt has been admitted, cytotec 25 mcg per vagina. Will re-evaluate in 4 hours.  GBBS- negative  Fetal status-reassuring    Joanie Morales          "

## 2019-12-15 LAB
ERYTHROCYTE [DISTWIDTH] IN BLOOD BY AUTOMATED COUNT: 45.2 FL (ref 35.9–50)
HCT VFR BLD AUTO: 36.3 % (ref 37–47)
HGB BLD-MCNC: 12.4 G/DL (ref 12–16)
MCH RBC QN AUTO: 29 PG (ref 27–33)
MCHC RBC AUTO-ENTMCNC: 34.2 G/DL (ref 33.6–35)
MCV RBC AUTO: 84.8 FL (ref 81.4–97.8)
PLATELET # BLD AUTO: 166 K/UL (ref 164–446)
PMV BLD AUTO: 10.7 FL (ref 9–12.9)
RBC # BLD AUTO: 4.28 M/UL (ref 4.2–5.4)
WBC # BLD AUTO: 19.5 K/UL (ref 4.8–10.8)

## 2019-12-15 PROCEDURE — 0UQGXZZ REPAIR VAGINA, EXTERNAL APPROACH: ICD-10-PCS | Performed by: OBSTETRICS & GYNECOLOGY

## 2019-12-15 PROCEDURE — 770002 HCHG ROOM/CARE - OB PRIVATE (112)

## 2019-12-15 PROCEDURE — 700111 HCHG RX REV CODE 636 W/ 250 OVERRIDE (IP): Performed by: OBSTETRICS & GYNECOLOGY

## 2019-12-15 PROCEDURE — 85027 COMPLETE CBC AUTOMATED: CPT

## 2019-12-15 PROCEDURE — 700102 HCHG RX REV CODE 250 W/ 637 OVERRIDE(OP): Performed by: OBSTETRICS & GYNECOLOGY

## 2019-12-15 PROCEDURE — 36415 COLL VENOUS BLD VENIPUNCTURE: CPT

## 2019-12-15 PROCEDURE — A9270 NON-COVERED ITEM OR SERVICE: HCPCS | Performed by: OBSTETRICS & GYNECOLOGY

## 2019-12-15 RX ORDER — SODIUM CHLORIDE 9 MG/ML
INJECTION, SOLUTION INTRAVENOUS
Status: ACTIVE
Start: 2019-12-15 | End: 2019-12-15

## 2019-12-15 RX ADMIN — VITAMIN A, VITAMIN C, VITAMIN D-3, VITAMIN E, VITAMIN B-1, VITAMIN B-2, NIACIN, VITAMIN B-6, CALCIUM, IRON, ZINC, COPPER 1 TABLET: 4000; 120; 400; 22; 1.84; 3; 20; 10; 1; 12; 200; 27; 25; 2 TABLET ORAL at 07:21

## 2019-12-15 RX ADMIN — CEFAZOLIN SODIUM 2 G: 2 INJECTION, SOLUTION INTRAVENOUS at 09:48

## 2019-12-15 RX ADMIN — CEFAZOLIN SODIUM 2 G: 2 INJECTION, SOLUTION INTRAVENOUS at 23:57

## 2019-12-15 RX ADMIN — CEFAZOLIN SODIUM 2 G: 2 INJECTION, SOLUTION INTRAVENOUS at 00:05

## 2019-12-15 RX ADMIN — IBUPROFEN 600 MG: 600 TABLET ORAL at 07:21

## 2019-12-15 RX ADMIN — CEFAZOLIN SODIUM 2 G: 2 INJECTION, SOLUTION INTRAVENOUS at 17:05

## 2019-12-15 RX ADMIN — IBUPROFEN 600 MG: 600 TABLET ORAL at 21:44

## 2019-12-15 RX ADMIN — OXYCODONE HYDROCHLORIDE AND ACETAMINOPHEN 1 TABLET: 5; 325 TABLET ORAL at 21:44

## 2019-12-15 ASSESSMENT — EDINBURGH POSTNATAL DEPRESSION SCALE (EPDS)
I HAVE FELT SCARED OR PANICKY FOR NO GOOD REASON: NO, NOT MUCH
I HAVE BEEN SO UNHAPPY THAT I HAVE HAD DIFFICULTY SLEEPING: NOT AT ALL
I HAVE BEEN ABLE TO LAUGH AND SEE THE FUNNY SIDE OF THINGS: AS MUCH AS I ALWAYS COULD
THINGS HAVE BEEN GETTING ON TOP OF ME: NO, I HAVE BEEN COPING AS WELL AS EVER
I HAVE LOOKED FORWARD WITH ENJOYMENT TO THINGS: RATHER LESS THAN I USED TO
I HAVE BEEN ANXIOUS OR WORRIED FOR NO GOOD REASON: NO, NOT AT ALL
I HAVE BEEN SO UNHAPPY THAT I HAVE BEEN CRYING: NO, NEVER
I HAVE FELT SAD OR MISERABLE: NO, NOT AT ALL
THE THOUGHT OF HARMING MYSELF HAS OCCURRED TO ME: NEVER
I HAVE BLAMED MYSELF UNNECESSARILY WHEN THINGS WENT WRONG: NO, NEVER

## 2019-12-15 NOTE — L&D DELIVERY NOTE
DATE OF SERVICE:  2019    ADMITTING DIAGNOSES:  1.  Intrauterine pregnancy at 38 weeks and 3 days.  2.  Oligohydramnios.  3.  Group B Streptococcus negative.  4.  One-hour glucose test elevated followed by a three-hour that was normal.    PROCEDURES:  1.  Admission to labor and delivery.  2.  Cervical ripening with Cytotec 25 mcg x1 followed by a Cook cervical   ripener in place x12 hours followed by Pitocin that got up to 30 milliunits   maximum.  3.  Normal spontaneous vaginal delivery of a vigorous male with Apgars 8 and   9.  4.  Epidural.    PROCEDURE IN DETAIL:  This is a 29-year-old  2, now para 1 with a due   date of 2019 who was admitted at 38 weeks and 3 days secondary to   oligohydramnios with an BALJIT of 4.3.  The patient arrived to labor and   delivery.  She had a reassuring fetal heart tracing.  She was GBS negative.    Therefore, she was started on Cytotec for cervical ripening.  She was closed,   thick and high.  She had 1 dose of 25 mcg per vagina.  Four hours later, she   was 1 cm dilated, 50% effaced, -3 station and ahmet too much for another   Cytotec.  Therefore, a [employer] cervical ripening balloon was placed for 12   hours.  At 12-hour ara, it was removed, which was about 7 in the morning.  At   that time, she was about 2 cm dilated and she had artificial rupture of   membranes at 7:38 in the morning, which was clear.  Patient progressed with   Pitocin.  She got an epidural for pain control and then progressed to complete   at 7:05 p.m.  Patient then started pushing and then she was prepped and   draped in the usual sterile fashion.  Fetal status was overall reassuring.  At   8:52 p.m., I assisted with a normal spontaneous vaginal delivery of a   vigorous male in CAITIE position.  The head was delivered atraumatically and the   rest of the infant delivered without any problems.  Mouth and nose were bulb   suctioned.  Infant placed on maternal abdomen.  Delayed cord clamping  was   performed and then the cord was doubly clamped and cut by the infant's father.    The placenta was then delivered intact at 9:01 p.m.  Three-vessel cord was   noted.  Uterus was firm and hemostatic.  She did have a first-degree left   vaginal laceration that was repaired with a 2-0 Vicryl on a CT-1 needle   without any problems.  Patient tolerated the procedure well.  Baby and mom are   doing well.  Infant's weight is pending.  This was again a vigorous male with   Apgars 8 and 9.  EBL was 300 mL.       ____________________________________     MD MICHELLE VERAS / ИВАН    DD:  12/14/2019 21:23:21  DT:  12/14/2019 21:40:47    D#:  [id number]  Job#:  [id number]

## 2019-12-15 NOTE — CARE PLAN
Problem: Pain Management  Goal: Pain level will decrease to patient's comfort goal  Outcome: PROGRESSING AS EXPECTED  Note:   Pt request pain meds as needed, will continue to monitor.     Problem: Altered physiologic condition related to immediate post-delivery state and potential for bleeding/hemorrhage  Goal: Patient physiologically stable as evidenced by normal lochia, palpable uterine involution and vital signs within normal limits  Outcome: PROGRESSING AS EXPECTED  Note:   Fundus firm and lochia light.

## 2019-12-15 NOTE — PROGRESS NOTES
Labor Progress Note    Pricila Velasquez   38w4d      Subjective:  Pt comfortable with epidural.  Uterine contractions:no  Pain: No    Objective:   Vitals:    19 1412 19 1420 19 1510 19 1556   BP: 107/62 118/67     Pulse: 77 73     Temp:   37.2 °C (98.9 °F) 37.3 °C (99.1 °F)   TempSrc:       SpO2:       Weight:       Height:         Fetal heart variability:130's category 1  [place]: q 2 min    Membranes ruptured: .yes    Meds:   Epidural : .yes  Pitocin: .yes, 9 mu    Labs:  No results found for this or any previous visit (from the past 24 hour(s)).    Assessment:   38w4d/oligohydramnios- CPM, expt .  GBBS-negative  Fetal status-reassuring        Joanie Morales

## 2019-12-15 NOTE — ANESTHESIA POSTPROCEDURE EVALUATION
Patient: Pricila Velasquez    Procedure Summary     Date:  12/14/19 Room / Location:      Anesthesia Start:  1119 Anesthesia Stop:  2052    Procedure:  Labor Epidural Diagnosis:      Scheduled Providers:   Responsible Provider:  Melvin Villarreal M.D.    Anesthesia Type:  epidural ASA Status:  2          Final Anesthesia Type: epidural  Last vitals  BP   Blood Pressure: 124/63    Temp   37.7 °C (99.9 °F)    Pulse   Pulse: 72   Resp        SpO2   97 %      Anesthesia Post Evaluation    Patient location during evaluation: PACU  Patient participation: complete - patient participated  Level of consciousness: awake and alert    Airway patency: patent  Anesthetic complications: no  Cardiovascular status: hemodynamically stable  Respiratory status: acceptable  Hydration status: euvolemic    PONV: none           Nurse Pain Score: 0 (NPRS)

## 2019-12-15 NOTE — PROGRESS NOTES
Pt. Arrived from floor wheelchair, received report from KAPIL Wharton. Assessment complete VSS. Pt oriented to room, call light, emergency light, bulb syringe, and placing baby on back to sleep in Sam[place]. Call light within reach. Pt. Requested pain medication as needed, will continue to monitor.

## 2019-12-15 NOTE — ANESTHESIA TIME REPORT
Anesthesia Start and Stop Event Times     Date Time Event    12/14/2019 1119 Anesthesia Start     1300 Ready for Procedure     2052 Anesthesia Stop        Responsible Staff  12/14/19    Name Role Begin End    Melvin Villarreal M.D. Anesth 1119 2052        Preop Diagnosis (Free Text):  Pre-op Diagnosis             Preop Diagnosis (Codes):    Post op Diagnosis  Pregnancy      Premium Reason  E. Weekend    Comments: premium #70

## 2019-12-15 NOTE — L&D DELIVERY NOTE
Delivery note    , male CAITIE with apgars 8 and 9. Placenta intact, 3 vc. 1 st degree left vaginal laceration repaired with 2-0 vicryl on ct-1 needle. EBL: 300. Mom and baby doing well. Pending weight.

## 2019-12-15 NOTE — PROGRESS NOTES
Received bedside report from KAPIL Geller. Assumed pt [id number]. Awake and alert. Report mild-moderate pain, medicated per MAR. Primarily Yi speaking, FOB at bedside and english speaking. All needs met at this time.

## 2019-12-15 NOTE — PROGRESS NOTES
185 Report received from SILVA Ordaz RN. Pt warm to touch. Temp 99.9F orally.      Dr Morales at bedside. SVE complete. Pushing efforts started with this RN continuously at bedside.      of male infant with APGARs 8/9. Interventions during pushing were: Modified pushing efforts, repositioning, and supplemental oxygen, see flowsheets.      Dr Morales updated on temp of 101.6F. Orders received to give Tylenol 650mg PO now and recheck temp prior to transfer to PP unit.      Dr Morales updated on temp of 101F oral. Orders received for CBC, Blood cultures x2 from 2 different sites, Urine culture, then start on 2g Anceg q8hrs for 48 hours.       at bedside for blood draws. Pt educated on POC and verbalized understanding.     2250 Pt ambulated SBA to restroom and voided without difficulty. Pericare performed. Urine culture sent to lab.     2300 Pt and infant transferred in stable condition to [facility] via w/c. All personal belongings brought by FOB.     2315 Report given to KAPIL Geller including orders to start ANCEF abx. Cuddles and baby bands verified with receiving unit.

## 2019-12-15 NOTE — ANESTHESIA QCDR
2019 [facility] Clinical Data Registry (for Quality Improvement)     Postoperative nausea/vomiting risk protocol (Adult = 18 yrs and Pediatric 3-17 yrs)- (430 and 463)  General inhalation anesthetic (NOT TIVA) with PONV risk factors: No  Provision of anti-emetic therapy with at least 2 different classes of agents: N/A  Patient DID NOT receive anti-emetic therapy and reason is documented in Medical Record: N/A    Multimodal Pain Management- (AQI59)  Patient undergoing Elective Surgery (i.e. Outpatient, or ASC, or Prescheduled Surgery prior to Hospital Admission): No  Use of Multimodal Pain Management, two or more drugs and/or interventions, NOT including systemic opioids: N/A  Exception: Documented allergy to multiple classes of analgesics: N/A    PACU assessment of acute postoperative pain prior to Anesthesia Care End- Applies to Patients Age = 18- (ABG7)  Initial PACU pain score is which of the following: < 7/10  Patient unable to report pain score: N/A    Post-anesthetic transfer of care checklist/protocol to PACU/ICU- (426 and 427)  Upon conclusion of case, patient transferred to which of the following locations: PACU/Non-ICU  Use of transfer checklist/protocol: Yes  Exclusion: Service Performed in Patient Hospital Room (and thus did not require transfer): N/A    PACU Reintubation- (AQI31)  General anesthesia requiring endotracheal intubation (ETT) along with subsequent extubation in OR or PACU: No  Required reintubation in the PACU: N/A  Extubation was a planned trial documented in the medical record prior to removal of the original airway device: N/A    Unplanned admission to ICU related to anesthesia service up through end of PACU care- (MD51)  Unplanned admission to ICU (not initially anticipated at anesthesia start time): No

## 2019-12-15 NOTE — PROGRESS NOTES
Labor Progress Note    Pricila Velasquez   38w4d      Subjective:  Pt comfortable with a little pressure.  Uterine contractions:yes  Pain: No    Objective:   Vitals:    19 1806 19 1821 19 1852 19 1854   BP:  156/67 125/60    Pulse:  75 71    Temp: 37.2 °C (99 °F)   37.7 °C (99.9 °F)   TempSrc:    Oral   SpO2:       Weight:       Height:         Fetal heart variability: 150's category 1  [place]: q 2  SVE: c/c/+2      Membranes ruptured: .yes    Meds:   Epidural : .yes  Pitocin: .yes, 9mu    Labs:  No results found for this or any previous visit (from the past 24 hour(s)).    Assessment:   38w4d/oligohydramnios- complete, push, expt   GBBS-negative  Fetal status-reassuring        Joanie Morales

## 2019-12-15 NOTE — PROGRESS NOTES
Progress Note    Pricila Velasquez PP day 1  Chief Admitting Dx: PREGNANCY  Labor and delivery, indication for care  Labor and delivery, indication for care  Delivery Type: vaginal, spontaneous      Subjective:  Pt had a temperature elevation of 101.0 at 22:19 after delivery. Pt now on Ancef and afebrile.   Ambulating: yes  Tolerating oral feed: yes  Flatus: yes    Voiding: yes  Dizziness: N\A  Vitals:    12/14/19 2300 12/14/19 2330 12/15/19 0211 12/15/19 0600   BP:  111/65 105/69 117/72   Pulse:  (!) 101 75 85   Resp:  18 18 18   Temp: 37.7 °C (99.9 °F) 37.5 °C (99.5 °F) 37.1 °C (98.8 °F) 36.7 °C (98.1 °F)   TempSrc: Temporal Oral Oral Oral   SpO2:  96% 96% 96%   Weight:       Height:           Exam:  Gen: NAD  Abdomen: Abdomen soft, non-tender. BS normal. No masses,  No organomegaly  Fundus Tenderness: no  Below umbilicus: yes  Extremities: 1+ edema  Lochia: mild    Labs:   Recent Results (from the past 24 hour(s))   CBC WITH DIFFERENTIAL    Collection Time: 12/14/19 10:36 PM   Result Value Ref Range    WBC 20.2 (H) 4.8 - 10.8 K/uL    RBC 4.44 4.20 - 5.40 M/uL    Hemoglobin 12.9 12.0 - 16.0 g/dL    Hematocrit 37.9 37.0 - 47.0 %    MCV 85.4 81.4 - 97.8 fL    MCH 29.1 27.0 - 33.0 pg    MCHC 34.0 33.6 - 35.0 g/dL    RDW 44.3 35.9 - 50.0 fL    Platelet Count 179 164 - 446 K/uL    MPV 10.8 9.0 - 12.9 fL    Neutrophils-Polys 85.30 (H) 44.00 - 72.00 %    Lymphocytes 6.10 (L) 22.00 - 41.00 %    Monocytes 7.30 0.00 - 13.40 %    Eosinophils 0.00 0.00 - 6.90 %    Basophils 0.10 0.00 - 1.80 %    Immature Granulocytes 1.20 (H) 0.00 - 0.90 %    Nucleated RBC 0.00 /100 WBC    Neutrophils (Absolute) 17.21 (H) 2.00 - 7.15 K/uL    Lymphs (Absolute) 1.24 1.00 - 4.80 K/uL    Monos (Absolute) 1.47 (H) 0.00 - 0.85 K/uL    Eos (Absolute) 0.00 0.00 - 0.51 K/uL    Baso (Absolute) 0.03 0.00 - 0.12 K/uL    Immature Granulocytes (abs) 0.25 (H) 0.00 - 0.11 K/uL    NRBC (Absolute) 0.00 K/uL   BLOOD CULTURE    Collection Time: 12/14/19  10:36 PM   Result Value Ref Range    Significant Indicator NEG     Source BLD     Site PERIPHERAL     Culture Result       No Growth  Note: Blood cultures are incubated for 5 days and  are monitored continuously.Positive blood cultures  are called to the RN and reported as soon as  they are identified.     BLOOD CULTURE    Collection Time: 12/14/19 10:36 PM   Result Value Ref Range    Significant Indicator NEG     Source BLD     Site PERIPHERAL     Culture Result       No Growth  Note: Blood cultures are incubated for 5 days and  are monitored continuously.Positive blood cultures  are called to the RN and reported as soon as  they are identified.     CBC without differential    Collection Time: 12/15/19  5:18 AM   Result Value Ref Range    WBC 19.5 (H) 4.8 - 10.8 K/uL    RBC 4.28 4.20 - 5.40 M/uL    Hemoglobin 12.4 12.0 - 16.0 g/dL    Hematocrit 36.3 (L) 37.0 - 47.0 %    MCV 84.8 81.4 - 97.8 fL    MCH 29.0 27.0 - 33.0 pg    MCHC 34.2 33.6 - 35.0 g/dL    RDW 45.2 35.9 - 50.0 fL    Platelet Count 166 164 - 446 K/uL    MPV 10.7 9.0 - 12.9 fL       Assessment/plan:  Continue Routine postpartum care  Consider Discharge 24h-48 hours  Febrile Ilness- pt now afebrile x 23 hours on Ancef, blood cultures and urine culture pending. Possible d/c tomorrow.  Joanie Morales M.D.

## 2019-12-16 VITALS
DIASTOLIC BLOOD PRESSURE: 61 MMHG | HEART RATE: 61 BPM | SYSTOLIC BLOOD PRESSURE: 113 MMHG | WEIGHT: 168 LBS | TEMPERATURE: 97.3 F | HEIGHT: 59 IN | RESPIRATION RATE: 17 BRPM | OXYGEN SATURATION: 93 % | BODY MASS INDEX: 33.87 KG/M2

## 2019-12-16 PROCEDURE — A9270 NON-COVERED ITEM OR SERVICE: HCPCS | Performed by: OBSTETRICS & GYNECOLOGY

## 2019-12-16 PROCEDURE — 700102 HCHG RX REV CODE 250 W/ 637 OVERRIDE(OP): Performed by: OBSTETRICS & GYNECOLOGY

## 2019-12-16 PROCEDURE — 700112 HCHG RX REV CODE 229: Performed by: OBSTETRICS & GYNECOLOGY

## 2019-12-16 RX ORDER — IBUPROFEN 600 MG/1
600 TABLET ORAL EVERY 6 HOURS PRN
Qty: 60 TAB | Refills: 1 | Status: SHIPPED | OUTPATIENT
Start: 2019-12-16

## 2019-12-16 RX ADMIN — VITAMIN A, VITAMIN C, VITAMIN D-3, VITAMIN E, VITAMIN B-1, VITAMIN B-2, NIACIN, VITAMIN B-6, CALCIUM, IRON, ZINC, COPPER 1 TABLET: 4000; 120; 400; 22; 1.84; 3; 20; 10; 1; 12; 200; 27; 25; 2 TABLET ORAL at 06:12

## 2019-12-16 RX ADMIN — DOCUSATE SODIUM 100 MG: 100 CAPSULE, LIQUID FILLED ORAL at 08:08

## 2019-12-16 RX ADMIN — IBUPROFEN 600 MG: 600 TABLET ORAL at 08:08

## 2019-12-16 NOTE — LACTATION NOTE
"All information provided to MOB by [employer] in MOB's preferred language of Kazakh by  #[id number] [place].    Met with MOB for a lactation follow up visit.  MOB reported breastfeeding is going \"tom\" or \"good\" and declined lactation assistance at this time.  MOB denied pain and tissue damage to nipples and areolas with latch.    Reviewed signs of successful milk transfer with parents of infant.    Encouraged MOB to offer infant both breasts at each feeding and reminded MOB that breastfeeds should be a minimum of 10-15 minutes or longer.  If infant begins to fall asleep shortly after feed begins, MOB instructed to wake infant up.    Breastfeeding Plan:  Continue to offer infant the breast on demand per feeding cues and within three hours from the last feed for a total of 8 or more feeds in a 24 hour period.    MOB again instructed to follow up with [facility] with any lactation questions and/or concerns that arise post discharge.    MOB verbalized understanding of all information provided to her and denied having any further questions at this time.  Encouraged MOB to call for lactation assistance as needed.    "

## 2019-12-16 NOTE — CONSULTS
"All information provided to MOB in her preferred language of Azeri by [employer]  #[id number] [place].    Met with MOB for an initial lactation visit.  MOB delivered her first baby yesterday, 12/14/19, at 2052 at 38.4 weeks gestation.  Infant is approximately 21 hours old.  MOB stated breastfeeding is going \"good and bad\" and would like assistance with positioning and latching infant onto the breast. MOB reported infant latched onto the breast approximately 30 minutes ago and suckled, but she stated she is not sure if infant actually consumed any breast milk.  Infant has voided once and stooled twice since delivery.    Demonstrated to MOB on how to perform hand massage and hand expression at each breast and small drops of yellow tinged colostrum was expelled from each breast with minimal to moderate effort.      Infant undressed down to the diaper and assisted MOB with putting infant to the left breast in MOB's preferred position of the football hold.  Infant placed tummy to tummy with MOB.  Demonstrated to MOB on how wedge breast for deeper latch and how to stroke her nipple down infant's nose to chin to stimulate infant to suckle.  Drops of colostrum also placed onto infant's lips.  Infant did not attempt to latch onto the breast and was not observed to be showing any hunger signs.  Infant removed from the breast and placed skin to skin with MOB.    Discussed what to expect with breastfeeding in the first 24-48-72 hours following delivery as well as signs of successful milk transfer.    Discussed shallow latch vs deep latch and encouraged MOB to keep infant's head aligned with her breast to keep infant from sliding down onto the nipple when breastfeeding.    MOB stated has [place] and is seen at the office in [place].  MOB informed of the outpatient lactation assistance available to her through [facility].    Breastfeeding Plan:  Offer infant the breast on demand per feeding cues and within 3 hours " from the last feed for a minimum of 8 or more feeds in a 24 hour period.  If infant unable to latch onto the breast between now and when infant turns 24 hours old, MOB encouraged to hand express colostrum onto a spoon and feed back expressed breast milk to infant.  If infant unable to latch onto the breast after turning 24 hours old, feeding plan may need to be implemented to include supplementation and pumping.    Above feeding plan reviewed with parents of infant and both verbalized understanding.    MOB verbalized understanding of all information provided to her and denied having any further questions at this time.  Encouraged MOB to call for lactation assistance as needed.

## 2019-12-16 NOTE — DISCHARGE SUMMARY
Discharge Summary:      Pricila Velasquez    Admit Date:   2019  Discharge Date:  2019     Admitting diagnosis:  PREGNANCY  Labor and delivery, indication for care  Labor and delivery, indication for care  Discharge Diagnosis: Status post vaginal, spontaneous.  Pregnancy Complications:oligohydramnios     History:  History reviewed. No pertinent past medical history.  OB History    Para Term  AB Living   2 1 1     1   SAB TAB Ectopic Molar Multiple Live Births           0 1      # Outcome Date GA Lbr Neto/2nd Weight Sex Delivery Anes PTL Lv   2 Term 19 38w4d / 01:47 3.085 kg (6 lb 12.8 oz) M Vag-Spont EPI N CRISTINE   1                  Patient has no known allergies.  There are no active problems to display for this patient.       Hospital Course:   29 y.o. , now para 1, was admitted with the above mentioned diagnosis, underwent Induction of Labor, and progressed to complete. Pt then had a vaginal, spontaneous delivery. Soon after delivery pt had a fever and was started on Ancef 2 grams q 8 hours. After 24 hours her IV infiltrated and antibiotics were discontinued since pt remained afebrile. Patient postpartum course was unremarkable, with progressive advancement in diet , ambulation and toleration of oral analgesia. Patient without complaints today and desires discharge.      Vitals:    12/15/19 1800 12/15/19 2144 12/15/19 2200 19 0200   BP: 108/65  103/60 117/63   Pulse: 85  93 71   Resp: 18 12 18 18   Temp: 36.4 °C (97.5 °F)  36.2 °C (97.2 °F) 36.4 °C (97.5 °F)   TempSrc: Oral  Temporal Temporal   SpO2: 96%  94% 96%   Weight:       Height:           Current Facility-Administered Medications   Medication Dose   • oxytocin (PITOCIN) 20 UNITS/1000ML LR (induction of labor)  0.5-20 ketty-units/min   • ibuprofen (MOTRIN) tablet 600 mg  600 mg   • oxyCODONE-acetaminophen (PERCOCET) 5-325 MG per tablet 1 Tab  1 Tab   • oxyCODONE-acetaminophen (PERCOCET) 5-325 MG per  tablet 2 Tab  2 Tab   • D5LR infusion     • ropivacaine (NAROPIN) injection     • LR infusion     • PRN oxytocin (PITOCIN) (20 Units/1000 mL) PRN for excessive uterine bleeding - See Admin Instr  125-999 mL/hr   • methylergonovine (METHERGINE) injection 0.2 mg  0.2 mg   • miSOPROStol (CYTOTEC) tablet 600 mcg  600 mcg   • carboPROST (HEMABATE) injection 250 mcg  250 mcg   • docusate sodium (COLACE) capsule 100 mg  100 mg   • bisacodyl (DULCOLAX) suppository 10 mg  10 mg   • magnesium hydroxide (MILK OF MAGNESIA) suspension 30 mL  30 mL   • prenatal plus vitamin (STUARTNATAL 1+1) 27-1 MG tablet 1 Tab  1 Tab   • ondansetron (ZOFRAN ODT) dispertab 4 mg  4 mg    Or   • ondansetron (ZOFRAN) syringe/vial injection 4 mg  4 mg   • lactated ringers infusion         Exam:  Gen: NAD  Breast Exam: negative  Abdomen: Abdomen soft, non-tender. BS normal. No masses,  No organomegaly  Fundus Non Tender: no  Extremity: extremities, peripheral pulses and reflexes normal     Labs:  Recent Labs     12/13/19  1450 12/14/19  2236 12/15/19  0518   WBC 12.4* 20.2* 19.5*   RBC 4.54 4.44 4.28   HEMOGLOBIN 13.2 12.9 12.4   HEMATOCRIT 39.1 37.9 36.3*   MCV 86.1 85.4 84.8   MCH 29.1 29.1 29.0   MCHC 33.8 34.0 34.2   RDW 45.2 44.3 45.2   PLATELETCT 205 179 166   MPV 11.0 10.8 10.7        Activity:   Discharge to home  Pelvic Rest x 6 weeks    Assessment:  normal postpartum course  Discharge Assessment: No areas of skin breakdown/redness.     Follow up: 6 weeks with Dr Morales   Discharge Meds:   Current Outpatient Medications   Medication Sig Dispense Refill   • ibuprofen (MOTRIN) 600 MG Tab Take 1 Tab by mouth every 6 hours as needed (For cramping after delivery; do not give if patient is receiving ketorolac (Toradol)). 60 Tab 1       Joanie Morales M.D.

## 2019-12-16 NOTE — CARE PLAN
Problem: Pain Management  Goal: Pain level will decrease to patient's comfort goal  Outcome: PROGRESSING AS EXPECTED  Note:   Pain med as needed, will continue to monitor.     Problem: Altered physiologic condition related to immediate post-delivery state and potential for bleeding/hemorrhage  Goal: Patient physiologically stable as evidenced by normal lochia, palpable uterine involution and vital signs within normal limits  Outcome: PROGRESSING AS EXPECTED  Note:   Fundus firm and lochia scant.

## 2019-12-16 NOTE — DISCHARGE INSTRUCTIONS
POSTPARTUM DISCHARGE INSTRUCTIONS FOR MOM    YOB: 1990   Age: 29 y.o.               Admit Date: 2019     Discharge Date: 2019  Attending Doctor:  Joanie Morales M.D.                  Allergies:  Patient has no known allergies.    Discharged to home by car. Discharged via wheelchair, hospital escort: Yes.  Special equipment needed: Not Applicable  Belongings with: Personal  Be sure to schedule a follow-up appointment with your primary care doctor or any specialists as instructed.     Discharge Plan:   Diet Plan: Discussed  Activity Level: Discussed  Confirmed Follow up Appointment: Patient to Call and Schedule Appointment  Confirmed Symptoms Management: Discussed  Medication Reconciliation Updated: Yes  Influenza Vaccine Indication: Not indicated: Previously immunized this influenza season and > 8 years of age    REASONS TO CALL YOUR OBSTETRICIAN:  1.   Persistent fever or shaking chills (Temperature higher than 100.4)  2.   Heavy bleeding (soaking more than 1 pad per hour); Passing clots  3.   Foul odor from vagina  4.   Mastitis (Breast infection; breast pain, chills, fever, redness)  5.   Urinary pain, burning or frequency  6.   Episiotomy infection  7.   Abdominal incision infection  8.   Severe depression longer than 24 hours    HAND WASHING  · Prior to handling the baby.  · Before breastfeeding or bottle feeding baby.  · After using the bathroom or changing the baby's diaper.    WOUND CARE  Ask your physician for additional care instructions.  In general:    ·  Incision:      · Keep clean and dry.    · Do NOT lift anything heavier than your baby for up to 6 weeks.    · There should not be any opening or pus.      VAGINAL CARE  · Nothing inside vagina for 6 weeks: no sexual intercourse, tampons or douching.  · Bleeding may continue for 2-4 weeks.  Amount may vary.    · Call your physician for heavy bleeding which means soaking more than 1 pad per hour    BIRTH CONTROL  · It  "is possible to become pregnant at any time after delivery and while breastfeeding.  · Plan to discuss a method of birth control with your physician at your follow up visit. visit.    DIET AND ELIMINATION  · Eating more fiber (bran cereal, fruits, and vegetables) and drinking plenty of fluids will help to avoid constipation.  · Urinary frequency after childbirth is normal.    POSTPARTUM BLUES  During the first few days after birth, you may experience a sense of the \"blues\" which may include impatience, irritability or even crying.  These feeling come and go quickly.  However, as many as 1 in 10 women experience emotional symptoms known as postpartum depression.    Postpartum depression:  May start as early as the second or third day after delivery or take several weeks or months to develop.  Symptoms of \"blues\" are present, but are more intense:  Crying spells; loss of appetite; feelings of hopelessness or loss of control; fear of touching the baby; over concern or no concern at all about the baby; little or no concern about your own appearance/caring for yourself; and/or inability to sleep or excessive sleeping.  Contact your physician if you are experiencing any of these symptoms.    Crisis Hotline:  · [place] Crisis Hotline:  7-581-DVVCUTK  Or 1-582.253.8127  · [place] Crisis Hotline:  1-253.935.3298  Or 196-060-8108    PREVENTING SHAKEN BABY:  If you are angry or stressed, PUT THE BABY IN THE CRIB, step away, take some deep breaths, and wait until you are calm to care for the baby.  DO NOT SHAKE THE BABY.  You are not alone, call a supporter for help.    · Crisis Call Center 24/7 crisis line 522-226-9694 or 1-181.632.4512  · You can also text them, text \"ANSWER\" to 410801    QUIT SMOKING/TOBACCO USE:  I understand the use of any tobacco products increases my chance of suffering from future heart disease and could cause other illnesses which may shorten my life. Quitting the use of tobacco products is the single " most important thing I can do to improve my health. For further information on smoking / tobacco cessation call a Toll Free Quit Line at 1-570.351.9530 (*National Cancer [employer]) or 1-478.908.8819 (American Lung Association) or you can access the web based program at www.lungusa.org.    · [place] Tobacco Users Help Line:  (717) 363-4991       Toll Free: 1-815.455.3646  · Quit Tobacco Program [facility] ([facility] (892)517-2140    DEPRESSION / SUICIDE RISK:  As you are discharged from this [facility], it is important to learn how to keep safe from harming yourself.    Recognize the warning signs:  · Abrupt changes in personality, positive or negative- including increase in energy   · Giving away possessions  · Change in eating patterns- significant weight changes-  positive or negative  · Change in sleeping patterns- unable to sleep or sleeping all the time   · Unwillingness or inability to communicate  · Depression  · Unusual sadness, discouragement and loneliness  · Talk of wanting to die  · Neglect of personal appearance   · Rebelliousness- reckless behavior  · Withdrawal from people/activities they love  · Confusion- inability to concentrate     If you or a loved one observes any of these behaviors or has concerns about self-harm, here's what you can do:  · Talk about it- your feelings and reasons for harming yourself  · Remove any means that you might use to hurt yourself (examples: pills, rope, extension cords, firearm)  · Get professional help from the community (Mental Health, Substance Abuse, psychological counseling)  · Do not be alone:Call your Safe Contact- someone whom you trust who will be there for you.  · Call your local CRISIS HOTLINE 088-4033 or 476-974-3388  · Call your local Children's Mobile Crisis Response Team [place] (111) 189-3222 or www.Coubic  · Call the toll free National Suicide Prevention Hotlines   · National Suicide Prevention Lifeline  337-211-GPYY (9843)  · Expert [facility] Network 800-SUICIDE (177-4833)    DISCHARGE SURVEY:  Thank you for choosing [facility].  We hope we provided you with very good care.  You may be receiving a survey in the mail.  Please fill it out.  Your opinion is valuable to us.    ADDITIONAL EDUCATIONAL MATERIALS GIVEN TO PATIENT:      DISCHARGE INSTRUCTIONS (Kinyarwanda) POSTPARTUM FOR MOM      ANIBAL LAS CASH:  · Antes de tocar el bebé.  · Antes del amamantamiento o darle al bebé lactancia artificial.  · Después de usar el baño.    CUIDADO DE LAS HERIDAS:  · La Incisión de la Operación Cesárea:  Mantenga limpea y seca, NO levante nada que pesa más que beyer bebé por 6 semenas.  No debe ninguna apertura ni pus.  · Episiotomía/Massiel Vaginal:  Use un spray de Tucks o Dermoplast, tome un baño de asiento cuando necesite (6 pulgadas), siga usando la botella Loida hasta que pare de sangrar.    CUIDADA DEL SENO:  · Lleve un sostén.  · Si esta` amamantando no use jabón en el seno ni en los pezones.  · Aplique lanolina si los pezones se ponen quebrazados y si le duelen.    CUIDADO DE LA VAGINA:  · [place] puede entrar la vagina por 6 semanas:  Actividad sexual, Ducha vaginal, Tampones.  · El sangramiento puede seguir por 2 a 4 semanas.  La cantidad es variable.  Llame a beyer med`ico(a) obstetra si hay mucha bryon (si usa ma`s de andrew toalla femenina cada hora).    CONTRACEPCIÒN:  · Es posible embarazarse en cualquier tiempo despus del parto y mientras el amamantamiento.  · Debe planear de discutir los metodos de cantracepción con beyer médico(a) cuando vuelva en 6 semanas para beyer revisión médica.    DIETA Y DEFECACÒN:  · Para evitar la constipación coma mas fibra (cereal salvado, fruta, y verduras) Y tome muchos liquidos.   · Es común orinar frecuentemente después del parto.    POSTPARTO Y LA DEPRESÒN:  Radha los primeros culp después del parto es común sentirse:  · Impaciente, irritable, o llorar  Estos sentimientos llegan y van  rapidamente.  No obstante, salbador andrew de cada marci mujeres tiene síntomas emocionales conocidos mohsen depresión postparto.  · Despresión Postparto:  Puede empezar tan pronto mohsen el joaquín o tercer día después del parto o puede durar algunas semanas o meses para desarrollar.  Síntomas de la depresión pueden presentarse, maritza son más intensos:  · Pérdida del hambre  · Frecuencia de llorar  · Sentirse desesperada o perder el control  · Demasiada o no suficiente preocupación con beyer bebé  · Tener miedo de tocar el bebé  · No preocuparse con beyer propia apariencia  · Incapacidad de dormir o dormir excesivamente    DEPRESIÒN / RIESGO AL SUICIDIO:    Cuando se le da de giorgi de alguna entidad de [facility], es importante aprender a mantener a darcie de hacerse daño a sí mismo.    Reconocer los signos de advertencia:  · Cambios bruscos en la peronalidad, positiva o negativa, incluyendo aumento de la energia  · Regalar posesiones  · Cambios en patrones de comer - significativos cambios de peso - positivos o negativos  · Cambio de patrones para dormir - no poder dormir o dormir todo el tiempo  · Falta de voluntad o incapacidad de comunicarse  · Depresión  · Lynda, desánimo y tristeza inusual  · Habla de querer morir  · Descuido del aspecto personal  · Rebeldía - comportamiento imprudente  · Retiro de personas y actividades que les gusta  · Confusión-incapacidad para concentrarse    Si usted o un ser querido observa cualquiera de estos comportamientos o tiene preocupaciones de hacerse daño a si mismo, aquí le damos lo que usted puede hacer:  · Hablar de ti - tus sentimientos y razones para hacerse wilfrid a si mismo  · Retire cualquier medio que se podría utilizar para lastimarse (ejemplos: píldoras, cuerdas, cordones de extensión, arma de marie)  · Obtenga ayuda profesional de la comunidad (isis mental, abuso de sustancias, orientación psicológica)  · No estar solo: llamar a un contacto seguro - andrew persona que confía en que  estará allí por usted  · Llamada local L´INEA de CRISIS [phone] y [phone]  · Llamada local [employer] [facility] de [facility] [phone] or www.[employer].Rixty  · Llamada gratuita nacional, líneas de prevención del suicidio  · Preventión del Suicidio Nacional Lifeline 952-119-EFXF (3649)  · Línea Nacional de la [place] 800-SUICIDE (724-2678)       ([employer] Links)          My signature on this form indicates that:  1.  I have reviewed and understand the above information  2.  My questions regarding this information have been answered to my satisfaction.  3.  I have formulated a plan with my discharge nurse to obtain my prescribed medication for home.

## 2019-12-16 NOTE — PROGRESS NOTES
Pt discharged at approximately 1305 via wheelchair with hospital escort. Infant placed in car seat by parent, and checked by RN.  Pt discharge instructions and prescriptions given to patient. Checked armbands. Clamp removed by Lizet DICK.  Cuddles removed by this RN. No further questions at this time.

## 2019-12-16 NOTE — PROGRESS NOTES
Notified Dr. Morales of pts infiltrated IV, orders were to discontinue abx orders. Pt has remained afebrile.

## 2019-12-16 NOTE — PROGRESS NOTES
Assumed care. Assessment completed, VSS. Fundus firm, lochia scant. Pt. Ambulating and voiding. Pt. Requests pain medication as scheduled.POC discussed in Macedonian, all questions answered. Encouraged to call with needs.

## 2019-12-17 LAB
BACTERIA UR CULT: NORMAL
SIGNIFICANT IND 70042: NORMAL
SITE SITE: NORMAL
SOURCE SOURCE: NORMAL

## 2019-12-20 LAB
BACTERIA BLD CULT: NORMAL
BACTERIA BLD CULT: NORMAL
SIGNIFICANT IND 70042: NORMAL
SIGNIFICANT IND 70042: NORMAL
SITE SITE: NORMAL
SITE SITE: NORMAL
SOURCE SOURCE: NORMAL
SOURCE SOURCE: NORMAL

## 2025-03-02 ENCOUNTER — OFFICE VISIT (OUTPATIENT)
Dept: URGENT CARE | Facility: PHYSICIAN GROUP | Age: 35
End: 2025-03-02
Payer: COMMERCIAL

## 2025-03-02 VITALS
OXYGEN SATURATION: 97 % | WEIGHT: 176.8 LBS | RESPIRATION RATE: 18 BRPM | TEMPERATURE: 97.7 F | BODY MASS INDEX: 35.64 KG/M2 | HEIGHT: 59 IN | DIASTOLIC BLOOD PRESSURE: 78 MMHG | SYSTOLIC BLOOD PRESSURE: 142 MMHG | HEART RATE: 93 BPM

## 2025-03-02 DIAGNOSIS — J30.2 SEASONAL ALLERGIES: ICD-10-CM

## 2025-03-02 PROCEDURE — 3077F SYST BP >= 140 MM HG: CPT | Performed by: STUDENT IN AN ORGANIZED HEALTH CARE EDUCATION/TRAINING PROGRAM

## 2025-03-02 PROCEDURE — 99203 OFFICE O/P NEW LOW 30 MIN: CPT | Performed by: STUDENT IN AN ORGANIZED HEALTH CARE EDUCATION/TRAINING PROGRAM

## 2025-03-02 PROCEDURE — 3078F DIAST BP <80 MM HG: CPT | Performed by: STUDENT IN AN ORGANIZED HEALTH CARE EDUCATION/TRAINING PROGRAM

## 2025-03-02 ASSESSMENT — ENCOUNTER SYMPTOMS
FEVER: 0
RESPIRATORY NEGATIVE: 1
CARDIOVASCULAR NEGATIVE: 1
CHILLS: 0

## 2025-03-02 NOTE — LETTER
March 2, 2025    To Whom It May Concern:         This is confirmation that Pricila Velasquez attended her scheduled appointment with Jairon Garces M.D. on 3/02/25. May return to work on 03/05 if feeling improved please excuse from time missed.          If you have any questions please do not hesitate to call me at the phone number listed below.    Sincerely,          Jairon Garces M.D.  [phone]

## 2025-03-03 NOTE — PROGRESS NOTES
"Subjective:   Pricila Velasquez is a 34 y.o. female who presents for Seasonal Allergies (Unsure if it is allergies. Watery eyes, cough X 5 days.)      HPI:     was used for interview.  30-year-old female presents with concern for either seasonal allergies or upper respiratory illness.  She says approximately 5 days ago her symptoms started itchy watery eyes itchy throat mild coughing.  Some ear fullness and sinus pressure.  Sneezing.  - She has tried a once daily antihistamine with mild improvement of her symptoms she been trying Pataday ophthalmic drops with some improvement of her symptoms.  - She denies any fevers or chills denies any productive cough.  She reports significant feeling of itchiness in the ears eyes nose and throat.  - She does have a history of seasonal allergies  - No sick contacts    Review of Systems   Constitutional:  Negative for chills and fever.   HENT:  Positive for congestion.    Respiratory: Negative.     Cardiovascular: Negative.        Medications:    ibuprofen Tabs  PRE- FORMULA PO    Allergies: Patient has no known allergies.    Problem List: Pricila Velasquez does not have a problem list on file.    Surgical History:  Past Surgical History:   Procedure Laterality Date    GYN SURGERY  2016    ectopic pregnancy - removal of left falopian tube       Past Social Hx: Pricila Velasquez  reports that she has never smoked. She has never used smokeless tobacco. She reports that she does not currently use alcohol. She reports that she does not use drugs.     Past Family Hx:  Pricila Velasquez family history is not on file.     Problem list, medications, and allergies reviewed by myself today in Epic.     Objective:     BP (!) 142/78 (BP Location: Left arm, Patient Position: Sitting, BP Cuff Size: Adult long)   Pulse 93   Temp 36.5 °C (97.7 °F) (Temporal)   Resp 18   Ht 1.499 m (4' 11\")   Wt 80.2 kg (176 lb 12.8 oz)   SpO2 97%   BMI 35.71 kg/m² "     Physical Exam  Constitutional:       Appearance: Normal appearance.   HENT:      Head: Normocephalic and atraumatic.      Right Ear: Tympanic membrane normal.      Left Ear: Tympanic membrane normal.      Nose: Nose normal. No congestion or rhinorrhea.      Mouth/Throat:      Mouth: Mucous membranes are moist.      Pharynx: Oropharynx is clear.   Eyes:      General: Lids are normal. No allergic shiner.        Right eye: No discharge.         Left eye: No discharge.      Extraocular Movements: Extraocular movements intact.      Conjunctiva/sclera: Conjunctivae normal.      Right eye: Right conjunctiva is not injected. No chemosis or exudate.     Left eye: Left conjunctiva is not injected. No chemosis or exudate.     Comments: Watery eyes bilateral   Cardiovascular:      Rate and Rhythm: Normal rate and regular rhythm.      Pulses: Normal pulses.      Heart sounds: Normal heart sounds.   Pulmonary:      Effort: Pulmonary effort is normal.      Breath sounds: Normal breath sounds.   Neurological:      Mental Status: She is alert.         Assessment/Plan:     Diagnosis and associated orders:     1. Seasonal allergies           Comments/MDM:     1. Seasonal allergies  Seasonal allergies versus viral upper respiratory illness.  Patient has been 5 days into this current attack.  Main concern is watery itchy eyes itchy nose and throat and ear pressure.  Best I recommend continue with a once daily antihistamine such as Zyrtec or Claritin  - Recommend use of a once daily nasal steroid such as Nasacort or Flonase  - If no improvement that can do an nasal antihistamine spray such as Astepro.  - Continue with Visine allergy or Pataday ophthalmic drops to assist with itchy runny nose.  - No evidence of any focal consolidation or bacterial infection.  - If no improvement, does recommend reevaluation.           Differential diagnosis, natural history, supportive care, and indications for immediate follow-up  discussed.    Advised the patient to follow-up with the primary care physician for recheck, reevaluation, and consideration of further management.    Please note that this dictation was created using voice recognition software. I have made a reasonable attempt to correct obvious errors, but I expect that there are errors of grammar and possibly content that I did not discover before finalizing the note.    Jairon Garces M.D.